# Patient Record
Sex: FEMALE | Race: WHITE
[De-identification: names, ages, dates, MRNs, and addresses within clinical notes are randomized per-mention and may not be internally consistent; named-entity substitution may affect disease eponyms.]

---

## 2017-05-04 ENCOUNTER — HOSPITAL ENCOUNTER (OUTPATIENT)
Age: 67
Discharge: HOME | End: 2017-05-04
Payer: MEDICARE

## 2017-05-04 DIAGNOSIS — C73: Primary | ICD-10-CM

## 2017-06-27 ENCOUNTER — HOSPITAL ENCOUNTER (OUTPATIENT)
Dept: HOSPITAL 76 - DI | Age: 67
Discharge: HOME | End: 2017-06-27
Attending: INTERNAL MEDICINE
Payer: MEDICARE

## 2017-06-27 DIAGNOSIS — R94.31: Primary | ICD-10-CM

## 2017-06-27 PROCEDURE — 93306 TTE W/DOPPLER COMPLETE: CPT

## 2018-05-22 ENCOUNTER — HOSPITAL ENCOUNTER (OUTPATIENT)
Dept: HOSPITAL 76 - DI | Age: 68
Discharge: HOME | End: 2018-05-22
Attending: INTERNAL MEDICINE
Payer: MEDICARE

## 2018-05-22 DIAGNOSIS — M85.89: ICD-10-CM

## 2018-05-22 DIAGNOSIS — Z78.0: ICD-10-CM

## 2018-05-22 DIAGNOSIS — M81.0: Primary | ICD-10-CM

## 2018-05-22 PROCEDURE — 77080 DXA BONE DENSITY AXIAL: CPT

## 2018-05-22 NOTE — DEXA REPORT
DEXA SCAN:  05/22/2018

 

CLINICAL INDICATION:  Postmenopausal.

 

TECHNIQUE:  Dual energy x-ray absorptiometry (DXA) was performed on a  [employer]
  system.  

Regions measured are the AP spine, femoral neck, and, if needed, forearm.

 

COMPARISON:  None.  In accordance with the International Society for Clinical 
Densitometry 

(ISCD) guidelines, data from previous exams may be reanalyzed using current 
recommendations and

techniques.  This is done to allow a more accurate basis for comparison with 
the current study.

 

FINDINGS

The data for the lumbar spine is as follows:







REGION BMD (g/cm/cm) T-SCORE Z-SCORE

 

L1 0.999 -1.1 0.6

 

L2 0.920 -2.3 -0.6

 

L3 1.022 -1.5 0.2

 

L4 1.044 -1.3 0.4

 

L1-L4 0.999 -1.5 0.2





NOTE:  All evaluable vertebrae are used for classification.

 

The data for the hip is as follows:





REGION BMD (g/cm/cm) T-SCORE Z-SCORE

 

Neck 0.713 -2.3 -0.8

 

TOTAL 0.741 -2.1 -0.8





NOTE:  The femoral neck or total proximal femur, whichever is lowest, is used 
for classification.

 

 

IMPRESSION

WHO CLASSIFICATION BASED ON THE INTERNATIONAL REFERENCE STANDARD IS OSTEOPENIA.

FRACTURE RISK IS INCREASED.

 

RECOMMENDATION:  Patients with diagnosis of osteoporosis or osteopenia should 
have regular bone

mineral density assessment. For those eligible for Medicare, routine testing is 
allowed once every 2 years.

Testing frequency can be increased for patients who have rapidly progressing 
disease or for those who 

are receiving medical therapy to restore bone mass.

 

COMMENT

World Health Organization (WHO) definitions for osteoporosis and osteopenia:

NORMAL BMD:  T-score at 1.0 or higher, fracture risk is low.

OSTEOPENIA BMD:  T-score between 1.0 and -2.5, fracture risk is increased.

OSTEOPOROSIS BMD:  T-score at 2.5 or lower, fracture risk high.

 

National Osteoporosis Foundation recommends:

1. Obtain adequate dietary calcium (at least 1200 mg per day) and vitamin D (400
-800 international units per day).

2. Participate, as appropriate, in regular weightbearing and muscle-
strengthening exercise.

3. Avoid tobacco use and reduce alcohol and caffeine intake.

4. For more detailed information see the website at www.NOF.org.

 

 

DD: 05/22/2018 10:41

TD: 05/22/2018 10:45

Job #: [id number]

SHU

## 2018-06-07 ENCOUNTER — HOSPITAL ENCOUNTER (OUTPATIENT)
Dept: HOSPITAL 76 - LAB.R | Age: 68
Discharge: HOME | End: 2018-06-07
Attending: INTERNAL MEDICINE
Payer: MEDICARE

## 2018-06-07 DIAGNOSIS — N30.00: Primary | ICD-10-CM

## 2018-06-07 PROCEDURE — 87086 URINE CULTURE/COLONY COUNT: CPT

## 2018-06-13 ENCOUNTER — HOSPITAL ENCOUNTER (OUTPATIENT)
Dept: HOSPITAL 76 - LAB | Age: 68
Discharge: HOME | End: 2018-06-13
Attending: INTERNAL MEDICINE
Payer: MEDICARE

## 2018-06-13 ENCOUNTER — HOSPITAL ENCOUNTER (EMERGENCY)
Dept: HOSPITAL 76 - ED | Age: 68
Discharge: HOME | End: 2018-06-13
Payer: MEDICARE

## 2018-06-13 VITALS — SYSTOLIC BLOOD PRESSURE: 118 MMHG | DIASTOLIC BLOOD PRESSURE: 60 MMHG

## 2018-06-13 DIAGNOSIS — Z79.899: ICD-10-CM

## 2018-06-13 DIAGNOSIS — E03.9: ICD-10-CM

## 2018-06-13 DIAGNOSIS — N20.2: Primary | ICD-10-CM

## 2018-06-13 DIAGNOSIS — M81.0: ICD-10-CM

## 2018-06-13 DIAGNOSIS — M81.8: ICD-10-CM

## 2018-06-13 DIAGNOSIS — C73: ICD-10-CM

## 2018-06-13 DIAGNOSIS — M81.0: Primary | ICD-10-CM

## 2018-06-13 DIAGNOSIS — E21.3: ICD-10-CM

## 2018-06-13 LAB
ALBUMIN DIAFP-MCNC: 3.9 G/DL (ref 3.2–5.5)
ALBUMIN/GLOB SERPL: 1.1 {RATIO} (ref 1–2.2)
ALP SERPL-CCNC: 68 IU/L (ref 42–121)
ALT SERPL W P-5'-P-CCNC: 21 IU/L (ref 10–60)
ANION GAP SERPL CALCULATED.4IONS-SCNC: 8 MMOL/L (ref 6–13)
AST SERPL W P-5'-P-CCNC: 25 IU/L (ref 10–42)
BASOPHILS NFR BLD AUTO: 0 10^3/UL (ref 0–0.1)
BASOPHILS NFR BLD AUTO: 0.7 %
BILIRUB BLD-MCNC: 0.7 MG/DL (ref 0.2–1)
BUN SERPL-MCNC: 17 MG/DL (ref 6–20)
CALCIUM UR-MCNC: 10.8 MG/DL (ref 8.5–10.3)
CHLORIDE SERPL-SCNC: 99 MMOL/L (ref 101–111)
CLARITY UR REFRACT.AUTO: (no result)
CO2 SERPL-SCNC: 29 MMOL/L (ref 21–32)
CREAT SERPLBLD-SCNC: 1.3 MG/DL (ref 0.4–1)
EOSINOPHIL # BLD AUTO: 0.1 10^3/UL (ref 0–0.7)
EOSINOPHIL NFR BLD AUTO: 1.3 %
ERYTHROCYTE [DISTWIDTH] IN BLOOD BY AUTOMATED COUNT: 12.4 % (ref 12–15)
GFRSERPLBLD MDRD-ARVRAT: 41 ML/MIN/{1.73_M2} (ref 89–?)
GLOBULIN SER-MCNC: 3.7 G/DL (ref 2.1–4.2)
GLUCOSE SERPL-MCNC: 86 MG/DL (ref 70–100)
GLUCOSE UR QL STRIP.AUTO: NEGATIVE MG/DL
HGB UR QL STRIP: 13.1 G/DL (ref 12–16)
KETONES UR QL STRIP.AUTO: NEGATIVE MG/DL
LYMPHOCYTES # SPEC AUTO: 2.4 10^3/UL (ref 1.5–3.5)
LYMPHOCYTES NFR BLD AUTO: 46.1 %
MCH RBC QN AUTO: 31.4 PG (ref 27–31)
MCHC RBC AUTO-ENTMCNC: 33.6 G/DL (ref 32–36)
MCV RBC AUTO: 93.4 FL (ref 81–99)
MONOCYTES # BLD AUTO: 0.4 10^3/UL (ref 0–1)
MONOCYTES NFR BLD AUTO: 7.4 %
NEUTROPHILS # BLD AUTO: 2.3 10^3/UL (ref 1.5–6.6)
NEUTROPHILS # SNV AUTO: 5.3 X10^3/UL (ref 4.8–10.8)
NEUTROPHILS NFR BLD AUTO: 44.5 %
NITRITE UR QL STRIP.AUTO: NEGATIVE
PDW BLD AUTO: 8 FL (ref 7.9–10.8)
PH UR STRIP.AUTO: 6.5 PH (ref 5–7.5)
PLATELET # BLD: 355 10^3/UL (ref 130–450)
PROT SPEC-MCNC: 7.6 G/DL (ref 6.7–8.2)
PROT UR STRIP.AUTO-MCNC: NEGATIVE MG/DL
RBC # UR STRIP.AUTO: (no result) /UL
RBC # URNS HPF: (no result) /HPF (ref 0–5)
RBC MAR: 4.16 10^6/UL (ref 4.2–5.4)
SODIUM SERPLBLD-SCNC: 136 MMOL/L (ref 135–145)
SP GR UR STRIP.AUTO: 1.01 (ref 1–1.03)
SQUAMOUS URNS QL MICRO: (no result)
TSH SERPL-ACNC: 2.5 UIU/ML (ref 0.34–5.6)
UROBILINOGEN UR QL STRIP.AUTO: (no result) E.U./DL
UROBILINOGEN UR STRIP.AUTO-MCNC: NEGATIVE MG/DL

## 2018-06-13 PROCEDURE — 96375 TX/PRO/DX INJ NEW DRUG ADDON: CPT

## 2018-06-13 PROCEDURE — 99284 EMERGENCY DEPT VISIT MOD MDM: CPT

## 2018-06-13 PROCEDURE — 96374 THER/PROPH/DIAG INJ IV PUSH: CPT

## 2018-06-13 PROCEDURE — 99283 EMERGENCY DEPT VISIT LOW MDM: CPT

## 2018-06-13 PROCEDURE — 74176 CT ABD & PELVIS W/O CONTRAST: CPT

## 2018-06-13 PROCEDURE — 36415 COLL VENOUS BLD VENIPUNCTURE: CPT

## 2018-06-13 PROCEDURE — 83970 ASSAY OF PARATHORMONE: CPT

## 2018-06-13 PROCEDURE — 96361 HYDRATE IV INFUSION ADD-ON: CPT

## 2018-06-13 PROCEDURE — 84443 ASSAY THYROID STIM HORMONE: CPT

## 2018-06-13 PROCEDURE — 83690 ASSAY OF LIPASE: CPT

## 2018-06-13 PROCEDURE — 84432 ASSAY OF THYROGLOBULIN: CPT

## 2018-06-13 PROCEDURE — 86800 THYROGLOBULIN ANTIBODY: CPT

## 2018-06-13 PROCEDURE — 85025 COMPLETE CBC W/AUTO DIFF WBC: CPT

## 2018-06-13 PROCEDURE — 81001 URINALYSIS AUTO W/SCOPE: CPT

## 2018-06-13 PROCEDURE — 80053 COMPREHEN METABOLIC PANEL: CPT

## 2018-06-13 PROCEDURE — 87086 URINE CULTURE/COLONY COUNT: CPT

## 2018-06-13 PROCEDURE — 81003 URINALYSIS AUTO W/O SCOPE: CPT

## 2018-06-13 NOTE — CT REPORT
Procedure Date:  06/13/2018   

Accession Number:  [id number] / [id number]                    

Procedure:  CT  - Abdomen/Pelvis W/O CPT Code:  

 

FULL RESULT:

 

 

EXAM:

CT ABDOMEN AND PELVIS (CT KUB)

 

EXAM DATE: 6/13/2018 10:03 PM.

 

CLINICAL HISTORY: Left flank pain.

 

COMPARISONS: None.

 

TECHNIQUE: Routine axial helical CT imaging was performed through the 

abdomen and pelvis without IV contrast. Reconstructions: Coronal and 

sagittal.

 

In accordance with CT protocol optimization, one or more of the following 

dose reduction techniques were utilized for this exam: automated exposure 

control, adjustment of mA and/or KV based on patient size, or use of 

iterative reconstructive technique.

 

FINDINGS:

Lung Bases: Unremarkable.

 

Right Kidney/Ureter: More than 10 nonobstructing stones in the kidney 

measuring up to 5 x 3 mm. No ureteral stone or obstructive uropathy seen.

 

Left Kidney/Ureter: More than 10 nonobstructing stones in the kidney. The 

largest stones measure 4 x 3 mm and 6 x 2 mm. Moderately obstructing 

stone in the distal ureter at the level of the acetabulum measuring 4 x 3 

mm, series 3 image 123.

 

Other Solid Organs: Noncontrast images of the solid organs are grossly 

unremarkable.

 

Gallbladder/Bile Ducts: Unremarkable.

 

Peritoneal Cavity: No bowel obstruction seen. No diverticulitis. No free 

air or free fluid. No lymphadenopathy. Appendix appears normal.

 

Pelvic Organs: No bladder stones or wall thickening. Noncontrast images 

of the visualized pelvic organs are unremarkable.

 

Vasculature: Mild atherosclerosis. No aortic aneurysm.

 

Other: None.

IMPRESSION:

1. Moderately obstructing 4 x 3 mm stone in the distal left ureter at the 

level of the acetabulum.

2. More than 10 nonobstructing stones in each kidney.

 

RADIA

## 2018-06-13 NOTE — ED PHYSICIAN DOCUMENTATION
PD HPI ABD PAIN





- Stated complaint


Stated Complaint: LT ABD/BACK PX





- Chief complaint


Chief Complaint: Abd Pain





- History obtained from


History obtained from: Patient, Family





- History of Present Illness


Timing - onset: How many days ago (4)


Timing - details: Gradual onset, Still present


Quality: Cramping, Aching


Location: LLQ


Radiation: Left flank


Associated symptoms: Nausea, Diarrhea.  No: Vomiting


Similar symptoms before: Work up / diagnostics


Recently seen: Clinic





- Additional information


Additional information: 





Patient is a 68 year old female who is presenting to the emergency department 

for abdominal pain.  Patient states that her symptoms started on thursday, (5 

days prior).  Patient was seen by her pmd and diagnosed with a uti.  patient 

was started on bactrim.  patient took a three day course.  Patietn states that 

her symptoms returned today.  patient had her blood drawn and came to the 

emergency department for evaluation.  patient states that her pain is mainly in 

her left lower quadrant with radiation to her left flank.  





Review of Systems


Constitutional: denies: Fever, Chills


Eyes: reports: Reviewed and negative


Ears: reports: Reviewed and negative


Cardiac: denies: Chest pain / pressure, Palpitations


GI: reports: Abdominal Pain, Nausea, Diarrhea.  denies: Vomiting, Constipation


: reports: Dysuria.  denies: Frequency, Hesitancy


Skin: denies: Rash, Lesions


Neurologic: denies: Generalized weakness, Focal weakness, Numbness


Immunocompromised: denies: Immunocompromised





PD PAST MEDICAL HISTORY





- Past Medical History


Respiratory: None


Endocrine/Autoimmune: HyPOthyroidism, Other


Psych: None


Musculoskeletal: None





- Past Surgical History


Past Surgical History: No





- Present Medications


Home Medications: 


 Ambulatory Orders











 Medication  Instructions  Recorded  Confirmed


 


Calcitriol 0.5 mg PO TID 01/12/16 01/12/16


 


Calcium Citrate 200 mg PO DAILY 01/12/16 01/12/16


 


Levothyroxine [Synthroid] 112 mcg PO DAILY 01/12/16 01/12/16


 


Ketorolac [Toradol] 10 mg PO Q6H #14 tablet 06/13/18 


 


Ondansetron Odt [Zofran] 4 mg TL Q6H PRN #14 tablet 06/13/18 


 


Tamsulosin [Flomax] 0.4 mg PO DAILY #14 capsule 06/13/18 














- Allergies


Allergies/Adverse Reactions: 


 Allergies











Allergy/AdvReac Type Severity Reaction Status Date / Time


 


azithromycin Allergy  Unknown Verified 06/13/18 20:55





[From Zithromax Z-Nikunj]     














- Social History


Does the pt smoke?: No


Smoking Status: Never smoker


Does the pt drink ETOH?: No


Does the pt have substance abuse?: No





PD ED PE NORMAL





- Vitals


Vital signs reviewed: Yes





- General


General: Alert and oriented X 3





- HEENT


HEENT: Atraumatic





- Cardiac


Cardiac: RRR





- Respiratory


Respiratory: No respiratory distress





- Derm


Derm: Normal color, Warm and dry





- Extremities


Extremities: No deformity





- Neuro


Neuro: Alert and oriented X 3


Eye Opening: Spontaneous


Motor: Obeys Commands


Verbal: Oriented


GCS Score: 15





PD ED PE EXPANDED





- HEENT


HEENT: Dry mucous membranes





- Abdomen


Abdomen: Tender to palpation, LUQ, LLQ.  No: Rebound, Guarding





Results





- Vitals


Vitals: 


 Vital Signs - 24 hr











  06/13/18 06/13/18





  20:53 22:37


 


Temperature 36.6 C 36.2 C L


 


Heart Rate 86 91


 


Respiratory 16 20





Rate  


 


Blood Pressure 118/73 118/60


 


O2 Saturation 100 95








 Oxygen











O2 Source                      Room air

















- Labs


Labs: 


 Laboratory Tests











  06/13/18





  21:00


 


Urine Color  YELLOW


 


Urine Clarity  CLOUDY


 


Urine pH  6.5


 


Ur Specific Gravity  1.015


 


Urine Protein  NEGATIVE


 


Urine Glucose (UA)  NEGATIVE


 


Urine Ketones  NEGATIVE


 


Urine Occult Blood  LARGE H


 


Urine Nitrite  NEGATIVE


 


Urine Bilirubin  NEGATIVE


 


Urine Urobilinogen  0.2 (NORMAL)


 


Ur Leukocyte Esterase  SMALL H


 


Urine RBC  TNTC H


 


Urine WBC  6-10 H


 


Ur Squamous Epith Cells  RARE Squamous


 


Urine Bacteria  None Seen


 


Ur Microscopic Review  INDICATED


 


Urine Culture Comments  INDICATED














- Rads (name of study)


  ** ct abd pelvis


Radiology: Final report received (multiple renal stones bilaterally over ten 

each side with partially obstruction 4mm by 3mm stone at left uvj)





PD MEDICAL DECISION MAKING





- ED course


Complexity details: reviewed old records, reviewed results, re-evaluated patient

, considered differential, d/w patient, d/w family


ED course: 





Patient was seen and examined at bedside.  IV access was gained.  patient's 

labs had been ordered earlier in the day.  patient was started on a fluid bolus

, toradol and zofran.  Urine was collected and showed hematuria.  Imaging was 

ordered.  when patient returned from imaging she stated her pain had resolved.  

images were reviewed and there were multiple stones, at least ten in each 

kidney.  there was one obstructing stone but it was less than 5mm.  patient had 

no urinary tract infection and was able to tolerate PO.  patient required no 

further inpatient work up at this time and was stable for discharge with 

outpatient follow up.  





- Sepsis Event


Vital Signs: 


 Vital Signs - 24 hr











  06/13/18 06/13/18





  20:53 22:37


 


Temperature 36.6 C 36.2 C L


 


Heart Rate 86 91


 


Respiratory 16 20





Rate  


 


Blood Pressure 118/73 118/60


 


O2 Saturation 100 95








 Oxygen











O2 Source                      Room air

















Departure





- Departure


Disposition: 01 Home, Self Care


Clinical Impression: 


 Kidney stones





Condition: Good


Instructions:  ED Stone Renal W Colic


Follow-Up: 


[facility] [Provider Group]


Prescriptions: 


Ketorolac [Toradol] 10 mg PO Q6H #14 tablet


Ondansetron Odt [Zofran] 4 mg TL Q6H PRN #14 tablet


 PRN Reason: Nausea / Vomiting


Tamsulosin [Flomax] 0.4 mg PO DAILY #14 capsule


Comments: 


Your symptoms today are being caused by kidney stones.  You do have multiple 

stones and it could be in part from your calcium supplements.  You should 

follow  up with your doctor and the urology group.  You should follow up with 

your endocrinologist concerning your calcium supplements.  You should stay well 

hydrated. and can take ibuprofen (600mg and tylenol 650mg) as needed for pain.  

You should return to the emergency department for fevers, chills, vomiting, new 

worsening or uncontrollable symptoms.  


Discharge Date/Time: 06/13/18 23:15

## 2018-06-15 LAB
THYROGLOB AB SERPL-ACNC: <1 IU/ML
THYROGLOB SERPL-MCNC: 0.3 NG/ML

## 2018-08-16 ENCOUNTER — HOSPITAL ENCOUNTER (OUTPATIENT)
Dept: HOSPITAL 76 - LAB | Age: 68
Discharge: HOME | End: 2018-08-16
Attending: INTERNAL MEDICINE
Payer: MEDICARE

## 2018-08-16 DIAGNOSIS — E20.8: ICD-10-CM

## 2018-08-16 DIAGNOSIS — C73: Primary | ICD-10-CM

## 2018-08-16 DIAGNOSIS — E89.0: ICD-10-CM

## 2018-08-16 LAB
ANION GAP SERPL CALCULATED.4IONS-SCNC: 6 MMOL/L (ref 6–13)
BUN SERPL-MCNC: 18 MG/DL (ref 6–20)
CALCIUM UR-MCNC: 8.5 MG/DL (ref 8.5–10.3)
CHLORIDE SERPL-SCNC: 101 MMOL/L (ref 101–111)
CO2 SERPL-SCNC: 28 MMOL/L (ref 21–32)
CREAT SERPLBLD-SCNC: 0.9 MG/DL (ref 0.4–1)
GFRSERPLBLD MDRD-ARVRAT: 63 ML/MIN/{1.73_M2} (ref 89–?)
GLUCOSE SERPL-MCNC: 96 MG/DL (ref 70–100)
SODIUM SERPLBLD-SCNC: 135 MMOL/L (ref 135–145)
T4 FREE SERPL-MCNC: 1.1 NG/DL (ref 0.58–1.64)
TSH SERPL-ACNC: 0.97 UIU/ML (ref 0.34–5.6)

## 2018-08-16 PROCEDURE — 84439 ASSAY OF FREE THYROXINE: CPT

## 2018-08-16 PROCEDURE — 36415 COLL VENOUS BLD VENIPUNCTURE: CPT

## 2018-08-16 PROCEDURE — 86800 THYROGLOBULIN ANTIBODY: CPT

## 2018-08-16 PROCEDURE — 82306 VITAMIN D 25 HYDROXY: CPT

## 2018-08-16 PROCEDURE — 80048 BASIC METABOLIC PNL TOTAL CA: CPT

## 2018-08-16 PROCEDURE — 83970 ASSAY OF PARATHORMONE: CPT

## 2018-08-16 PROCEDURE — 84443 ASSAY THYROID STIM HORMONE: CPT

## 2018-08-16 PROCEDURE — 84432 ASSAY OF THYROGLOBULIN: CPT

## 2018-08-20 LAB
THYROGLOB AB SERPL-ACNC: <1 IU/ML
THYROGLOB SERPL-MCNC: 0.5 NG/ML

## 2018-10-25 ENCOUNTER — HOSPITAL ENCOUNTER (OUTPATIENT)
Dept: HOSPITAL 76 - LAB | Age: 68
Discharge: HOME | End: 2018-10-25
Attending: PHYSICIAN ASSISTANT
Payer: MEDICARE

## 2018-10-25 DIAGNOSIS — N20.0: Primary | ICD-10-CM

## 2018-10-25 LAB
ALBUMIN DIAFP-MCNC: 4 G/DL (ref 3.2–5.5)
ANION GAP SERPL CALCULATED.4IONS-SCNC: 9 MMOL/L (ref 6–13)
BUN SERPL-MCNC: 14 MG/DL (ref 6–20)
CALCIUM UR-MCNC: 8.5 MG/DL (ref 8.5–10.3)
CHLORIDE SERPL-SCNC: 101 MMOL/L (ref 101–111)
CO2 SERPL-SCNC: 30 MMOL/L (ref 21–32)
CREAT SERPLBLD-SCNC: 1 MG/DL (ref 0.4–1)
GFRSERPLBLD MDRD-ARVRAT: 55 ML/MIN/{1.73_M2} (ref 89–?)
GLUCOSE SERPL-MCNC: 102 MG/DL (ref 70–100)
PHOSPHATE BLD-MCNC: 4.1 MG/DL (ref 2.5–4.6)
SODIUM SERPLBLD-SCNC: 140 MMOL/L (ref 135–145)
URATE SERPL-MCNC: 4.8 MG/DL (ref 2.6–7.2)

## 2018-10-25 PROCEDURE — 84550 ASSAY OF BLOOD/URIC ACID: CPT

## 2018-10-25 PROCEDURE — 80069 RENAL FUNCTION PANEL: CPT

## 2018-10-25 PROCEDURE — 36415 COLL VENOUS BLD VENIPUNCTURE: CPT

## 2019-01-17 ENCOUNTER — HOSPITAL ENCOUNTER (OUTPATIENT)
Dept: HOSPITAL 76 - LAB | Age: 69
Discharge: HOME | End: 2019-01-17
Attending: INTERNAL MEDICINE
Payer: MEDICARE

## 2019-01-17 DIAGNOSIS — E20.8: ICD-10-CM

## 2019-01-17 DIAGNOSIS — C73: Primary | ICD-10-CM

## 2019-01-17 DIAGNOSIS — E89.0: ICD-10-CM

## 2019-01-17 LAB
ANION GAP SERPL CALCULATED.4IONS-SCNC: 11 MMOL/L (ref 6–13)
BUN SERPL-MCNC: 16 MG/DL (ref 6–20)
CALCIUM UR-MCNC: 8.6 MG/DL (ref 8.5–10.3)
CHLORIDE SERPL-SCNC: 99 MMOL/L (ref 101–111)
CO2 SERPL-SCNC: 27 MMOL/L (ref 21–32)
CREAT SERPLBLD-SCNC: 1.1 MG/DL (ref 0.4–1)
GFRSERPLBLD MDRD-ARVRAT: 50 ML/MIN/{1.73_M2} (ref 89–?)
GLUCOSE SERPL-MCNC: 93 MG/DL (ref 70–100)
SODIUM SERPLBLD-SCNC: 137 MMOL/L (ref 135–145)
T4 FREE SERPL-MCNC: 1.13 NG/DL (ref 0.58–1.64)
TSH SERPL-ACNC: 3.09 UIU/ML (ref 0.34–5.6)

## 2019-01-17 PROCEDURE — 83970 ASSAY OF PARATHORMONE: CPT

## 2019-01-17 PROCEDURE — 84432 ASSAY OF THYROGLOBULIN: CPT

## 2019-01-17 PROCEDURE — 84439 ASSAY OF FREE THYROXINE: CPT

## 2019-01-17 PROCEDURE — 82306 VITAMIN D 25 HYDROXY: CPT

## 2019-01-17 PROCEDURE — 84443 ASSAY THYROID STIM HORMONE: CPT

## 2019-01-17 PROCEDURE — 80048 BASIC METABOLIC PNL TOTAL CA: CPT

## 2019-01-17 PROCEDURE — 86800 THYROGLOBULIN ANTIBODY: CPT

## 2019-01-17 PROCEDURE — 36415 COLL VENOUS BLD VENIPUNCTURE: CPT

## 2019-06-10 ENCOUNTER — HOSPITAL ENCOUNTER (OUTPATIENT)
Dept: HOSPITAL 76 - LAB.F | Age: 69
Discharge: HOME | End: 2019-06-10
Attending: INTERNAL MEDICINE
Payer: MEDICARE

## 2019-06-10 DIAGNOSIS — C73: Primary | ICD-10-CM

## 2019-06-10 DIAGNOSIS — E89.0: ICD-10-CM

## 2019-06-10 DIAGNOSIS — E20.8: ICD-10-CM

## 2019-06-10 LAB
ALBUMIN DIAFP-MCNC: 4.1 G/DL (ref 3.2–5.5)
ANION GAP SERPL CALCULATED.4IONS-SCNC: 10 MMOL/L (ref 6–13)
BUN SERPL-MCNC: 15 MG/DL (ref 6–20)
CALCIUM UR-MCNC: 8.6 MG/DL (ref 8.5–10.3)
CHLORIDE SERPL-SCNC: 101 MMOL/L (ref 101–111)
CO2 SERPL-SCNC: 29 MMOL/L (ref 21–32)
CREAT SERPLBLD-SCNC: 0.9 MG/DL (ref 0.4–1)
GFRSERPLBLD MDRD-ARVRAT: 62 ML/MIN/{1.73_M2} (ref 89–?)
GLUCOSE SERPL-MCNC: 94 MG/DL (ref 70–100)
MAGNESIUM SERPL-MCNC: 2.4 MG/DL (ref 1.7–2.8)
PHOSPHATE BLD-MCNC: 4.6 MG/DL (ref 2.5–4.6)
SODIUM SERPLBLD-SCNC: 140 MMOL/L (ref 135–145)
T4 FREE SERPL-MCNC: 1.01 NG/DL (ref 0.58–1.64)
TSH SERPL-ACNC: 2.6 UIU/ML (ref 0.34–5.6)

## 2019-06-10 PROCEDURE — 86800 THYROGLOBULIN ANTIBODY: CPT

## 2019-06-10 PROCEDURE — 84443 ASSAY THYROID STIM HORMONE: CPT

## 2019-06-10 PROCEDURE — 82306 VITAMIN D 25 HYDROXY: CPT

## 2019-06-10 PROCEDURE — 84432 ASSAY OF THYROGLOBULIN: CPT

## 2019-06-10 PROCEDURE — 80048 BASIC METABOLIC PNL TOTAL CA: CPT

## 2019-06-10 PROCEDURE — 83735 ASSAY OF MAGNESIUM: CPT

## 2019-06-10 PROCEDURE — 36415 COLL VENOUS BLD VENIPUNCTURE: CPT

## 2019-06-10 PROCEDURE — 84439 ASSAY OF FREE THYROXINE: CPT

## 2019-06-10 PROCEDURE — 80069 RENAL FUNCTION PANEL: CPT

## 2019-06-12 LAB — THYROGLOB SERPL-MCNC: 0.4 NG/ML

## 2019-12-18 ENCOUNTER — HOSPITAL ENCOUNTER (OUTPATIENT)
Dept: HOSPITAL 76 - DI.S | Age: 69
Discharge: HOME | End: 2019-12-18
Attending: NURSE PRACTITIONER
Payer: MEDICARE

## 2019-12-18 DIAGNOSIS — Z12.31: Primary | ICD-10-CM

## 2019-12-18 PROCEDURE — 77067 SCR MAMMO BI INCL CAD: CPT

## 2019-12-18 NOTE — MAMMOGRAPHY REPORT
Reason:  ROUTINE MAMMO

Procedure Date:  12/18/2019   

Accession Number:  [id number] / [id number]                    

Procedure:  MGS - Screening Mammo Dig Bilat CPT Code:  

 

***Final Report***

 

 

FULL RESULT:

 

 

EXAM: Screening Mammo Dig Bilat

 

DATE: 12/18/2019 8:24 AM

 

CLINICAL HISTORY:  Routine screening. History of benign left surgery

 

TECHNIQUE: (B) - Bilateral  CC and MLO views were obtained.

 

COMPARISON: 6/27/2016, 5/7/2014, 4/25/2012, 4/27/2011 and 3/31/2010

 

PARENCHYMAL PATTERN: (VD) - The breasts demonstrate extremely dense 

parenchyma bilaterally, limiting the sensitivity of mammography.

 

FINDINGS:

Right: No significant interval change. There are no suspicious masses, 

calcifications, or areas of distortion.

 

Left: Stable postsurgical change. 3 punctate new calcifications in the 

3:00 position 5 to 6 cm from the nipple for which magnification views are 

suggested. No new dominant mass or architectural distortion.

 

IMPRESSION: Incomplete examination.  BI-RADS category 0.  Needs 

additional evaluation left breast by magnification views. Negative right 

breast.

 

RECOMMENDATION: (ADDMAM) - Recommend additional mammographic views.   

Left breast

 

BI-RADS CATEGORY: (0) - Incomplete Examination - need additional 

evaluation.

 

STANDARD QUALIFYING STATEMENTS:

1.  This examination was not reviewed with the aid of Computer-Aided 

Detection (CAD).

2.  A negative or benign  imaging report should not preclude biopsy if 

clinically suspicious findings are present.

3.  Dense breasts may obscure an underlying neoplasm.

4. This examination was reviewed without the aid of 3D breast imaging 

(tomosynthesis).

## 2020-01-13 ENCOUNTER — HOSPITAL ENCOUNTER (OUTPATIENT)
Dept: HOSPITAL 76 - DI | Age: 70
Discharge: HOME | End: 2020-01-13
Attending: NURSE PRACTITIONER
Payer: MEDICARE

## 2020-01-13 DIAGNOSIS — R92.1: Primary | ICD-10-CM

## 2020-01-13 NOTE — MAMMOGRAPHY REPORT
Reason:  ABN MAMMO - FT SPEC VIEWS

Procedure Date:  01/13/2020   

Accession Number:  [id number] / [id number]                    

Procedure:  DEBORA - Diag Special Views Dig LT CPT Code:  

 

***Final Report***

 

 

FULL RESULT:

 

 

EXAM: Diag Special Views Dig LT

 

DATE: 1/13/2020 2:35 PM

 

CLINICAL HISTORY:  Diagnostic examination. History of benign left breast 

biopsy. The patient is recalled from screening examination for finding of 

3 punctate new calcifications in the 3:00 position of the left breast.

 

TECHNIQUE: (L) - Left  CC, spot magnified CC, LM, spot magnified LM views 

are obtained.

 

COMPARISON: 12/18/2019 through 3/31/2010.

 

PARENCHYMAL PATTERN: (D) - The breast(s) demonstrate(s) heterogeneously 

dense fibroglandular parenchyma.

 

FINDINGS:

The previously seen 3 punctate calcifications in the lateral left breast 

approximately 5.5 cm from the nipple on the cc projection show no 

associated architectural distortion or mass and are not definitively 

located on the LM projection, potentially 4.2 cm from the nipple in the 

lower breast, probably benign. There are no suspicious masses, 

calcifications, or areas of distortion.

 

IMPRESSION: Probably Benign. BI-RADS category 3.

 

RECOMMENDATION: (6MOS) - Recommend 6 month follow-up exam. Left breast.

 

BI-RADS CATEGORY: (3) - Probably Benign.

 

STANDARD QUALIFYING STATEMENTS:

1.  This examination was not reviewed with the aid of Computer-Aided 

Detection (CAD).

2.  A negative or benign  imaging report should not preclude biopsy if 

clinically suspicious findings are present.

3.  Dense breasts may obscure an underlying neoplasm.

4. This examination was reviewed with the aid of 3D breast imaging 

(tomosynthesis).

## 2020-05-21 ENCOUNTER — HOSPITAL ENCOUNTER (OUTPATIENT)
Dept: HOSPITAL 76 - LAB | Age: 70
Discharge: HOME | End: 2020-05-21
Attending: INTERNAL MEDICINE
Payer: MEDICARE

## 2020-05-21 DIAGNOSIS — E20.8: ICD-10-CM

## 2020-05-21 DIAGNOSIS — E89.0: Primary | ICD-10-CM

## 2020-05-21 LAB
T3 SERPL-MCNC: 0.76 NG/ML (ref 0.87–1.78)
T4 FREE SERPL-MCNC: 1.11 NG/DL (ref 0.58–1.64)

## 2020-05-21 PROCEDURE — 81599 UNLISTED MAAA: CPT

## 2020-05-21 PROCEDURE — 86800 THYROGLOBULIN ANTIBODY: CPT

## 2020-05-21 PROCEDURE — 84439 ASSAY OF FREE THYROXINE: CPT

## 2020-05-21 PROCEDURE — 84432 ASSAY OF THYROGLOBULIN: CPT

## 2020-05-21 PROCEDURE — 36415 COLL VENOUS BLD VENIPUNCTURE: CPT

## 2020-05-21 PROCEDURE — 84445 ASSAY OF TSI GLOBULIN: CPT

## 2020-05-21 PROCEDURE — 84480 ASSAY TRIIODOTHYRONINE (T3): CPT

## 2020-09-03 ENCOUNTER — HOSPITAL ENCOUNTER (OUTPATIENT)
Dept: HOSPITAL 76 - DI | Age: 70
Discharge: HOME | End: 2020-09-03
Attending: NURSE PRACTITIONER
Payer: MEDICARE

## 2020-09-03 DIAGNOSIS — R92.2: Primary | ICD-10-CM

## 2020-09-08 NOTE — MAMMOGRAPHY REPORT
UNILATERAL LEFT DIGITAL DIAGNOSTIC MAMMOGRAM 3D/2D: 9/3/2020

 

CLINICAL: Patient returns for 6 month follow up on left breast for calcifications.  

 

Comparison is made to exams dated:  1/13/2020 mammogram, 6/27/2016 mammogram, and 12/18/2019 mammogra
m - [facility].  The tissue of left breast is predominantly fatty.  

 

There is a 2 mm area of grouped punctate calcifications in the left breast at 4 o'clock middle depth.
  These are not significantly changed.  

No other significant masses or calcifications are seen in the breast.  

 

IMPRESSION: PROBABLY BENIGN

The 2 mm area of grouped punctate calcifications in the left breast are probably benign.  

A follow-up mammogram in 6 months is recommended to demonstrate stability.  

 

 

This exam was interpreted at Station ID: [id number].  

 

NOTE: For mammograms, a report in lay terms will be sent to the patient. Approximately 15% of breast 
malignancies will not be visualized mammographically. In the management of a palpable breast mass, a 
negative mammogram must not discourage biopsy of a clinically suspicious lesion.

 

SUMMARY:

Cumulative months of stability: 6. Recommend follow-up left breast diagnostic mammogram in 6 months t
o demonstrate continued stability. The patient will be due for right breast screening mammogram at th
e time of the follow-up exam.  

 

 

Electronically Signed By: Kaushik wasserman/[email or web address]:9/3/2020 16:40:42  

 

 

 

ACR BI-RADS Category 3: Probably benign 3343F

PARENCHYMAL PATTERN: (F) - The breast(s) demonstrate(s) diffuse fatty replacement.

BI-RADS CATEGORY: (3) - 3

Mammogram

[id number]

6 month follow-up

LATERALITY: (B)

## 2020-12-28 ENCOUNTER — HOSPITAL ENCOUNTER (OUTPATIENT)
Dept: HOSPITAL 76 - COV | Age: 70
Discharge: HOME | End: 2020-12-28
Attending: FAMILY MEDICINE
Payer: MEDICARE

## 2020-12-28 DIAGNOSIS — R05: Primary | ICD-10-CM

## 2020-12-28 DIAGNOSIS — Z20.828: ICD-10-CM

## 2020-12-28 DIAGNOSIS — R09.81: ICD-10-CM

## 2020-12-28 DIAGNOSIS — R06.02: ICD-10-CM

## 2021-04-22 ENCOUNTER — HOSPITAL ENCOUNTER (OUTPATIENT)
Dept: HOSPITAL 76 - LAB.S | Age: 71
Discharge: HOME | End: 2021-04-22
Attending: INTERNAL MEDICINE
Payer: MEDICARE

## 2021-04-22 DIAGNOSIS — E21.0: ICD-10-CM

## 2021-04-22 DIAGNOSIS — C73: Primary | ICD-10-CM

## 2021-04-22 LAB
ALBUMIN DIAFP-MCNC: 4.4 G/DL (ref 3.2–5.5)
ANION GAP SERPL CALCULATED.4IONS-SCNC: 12 MMOL/L (ref 6–13)
BUN SERPL-MCNC: 18 MG/DL (ref 6–20)
CALCIUM UR-MCNC: 9 MG/DL (ref 8.5–10.3)
CHLORIDE SERPL-SCNC: 100 MMOL/L (ref 101–111)
CO2 SERPL-SCNC: 27 MMOL/L (ref 21–32)
CREAT SERPLBLD-SCNC: 1 MG/DL (ref 0.4–1)
GFRSERPLBLD MDRD-ARVRAT: 55 ML/MIN/{1.73_M2} (ref 89–?)
GLUCOSE SERPL-MCNC: 93 MG/DL (ref 70–100)
PHOSPHATE BLD-MCNC: 4.9 MG/DL (ref 2.5–4.6)
POTASSIUM SERPL-SCNC: 3.8 MMOL/L (ref 3.5–5)
SODIUM SERPLBLD-SCNC: 139 MMOL/L (ref 135–145)

## 2021-04-22 PROCEDURE — 84432 ASSAY OF THYROGLOBULIN: CPT

## 2021-04-22 PROCEDURE — 82306 VITAMIN D 25 HYDROXY: CPT

## 2021-04-22 PROCEDURE — 36415 COLL VENOUS BLD VENIPUNCTURE: CPT

## 2021-04-22 PROCEDURE — 86800 THYROGLOBULIN ANTIBODY: CPT

## 2021-04-22 PROCEDURE — 80069 RENAL FUNCTION PANEL: CPT

## 2021-04-22 PROCEDURE — 84443 ASSAY THYROID STIM HORMONE: CPT

## 2021-05-21 ENCOUNTER — HOSPITAL ENCOUNTER (OUTPATIENT)
Dept: HOSPITAL 76 - DI | Age: 71
Discharge: HOME | End: 2021-05-21
Attending: INTERNAL MEDICINE
Payer: MEDICARE

## 2021-05-21 DIAGNOSIS — E21.0: Primary | ICD-10-CM

## 2021-05-21 DIAGNOSIS — M85.89: ICD-10-CM

## 2021-05-21 NOTE — DEXA REPORT
PROCEDURE: Dexa Spine and/or Hip

 

INDICATIONS: PRIMARY HYPERPARATHYROIDISM

 

TECHNIQUE: Dual energy x-ray absorptiometry (DXA) was performed on a [employer] System.  Regions measur
ed are the AP Spine, femoral neck, and if needed forearm.

 

COMPARISON: 5/22/2018.

  

FINDINGS:

 

Lumbar Spine: 

Bone Mineral Density   0.996 g/cm/cm,T score  -1.5. There is interval 0.3% decrease in total lumbar s
pine bone mineral density.

 

Left Hip:

Bone Mineral Density  0.736 g/cm/cm,T score -2.2. There is interval 0.7% decrease in left total hip b
one mineral density.

 

Left Femoral Neck:

Bone Mineral Density  0.741 g/cm/cm, T score -2.1.

 

(T score greater or equal to -1.0: NORMAL)

(T score from -1.1 to -2.4: OSTEOPENIA)

(T score less than or equal to -2.5 to: OSTEOPOROSIS)

 

Impression: Osteopenia.

 

Patients with diagnosis of osteoporosis or osteopenia should have regular bone mineral density assess
ment.  For those eligible for Medicare, routine testing is allowed once every 2 years.  Testing frequ
ency can be increased for patients who have rapidly progressing disease or for those who are receivin
g medical therapy to restore bone mass.

 

Reviewed by: Abraham Valencia MD on 5/21/2021 11:05 AM PDT

Approved by: Abraham Valencia MD on 5/21/2021 11:05 AM PDT

 

 

Station ID:  [id number]

## 2022-05-06 ENCOUNTER — HOSPITAL ENCOUNTER (OUTPATIENT)
Dept: HOSPITAL 76 - LAB | Age: 72
Discharge: HOME | End: 2022-05-06
Attending: INTERNAL MEDICINE
Payer: MEDICARE

## 2022-05-06 DIAGNOSIS — C73: Primary | ICD-10-CM

## 2022-05-06 DIAGNOSIS — E89.0: ICD-10-CM

## 2022-05-06 DIAGNOSIS — M81.0: ICD-10-CM

## 2022-05-06 DIAGNOSIS — N20.0: ICD-10-CM

## 2022-05-06 LAB
ALBUMIN DIAFP-MCNC: 4.3 G/DL (ref 3.2–5.5)
ANION GAP SERPL CALCULATED.4IONS-SCNC: 11 MMOL/L (ref 6–13)
BUN SERPL-MCNC: 16 MG/DL (ref 6–20)
CALCIUM UR-MCNC: 9.1 MG/DL (ref 8.5–10.3)
CHLORIDE SERPL-SCNC: 101 MMOL/L (ref 101–111)
CO2 SERPL-SCNC: 27 MMOL/L (ref 21–32)
CREAT SERPLBLD-SCNC: 1 MG/DL (ref 0.4–1)
GFRSERPLBLD MDRD-ARVRAT: 55 ML/MIN/{1.73_M2} (ref 89–?)
GLUCOSE SERPL-MCNC: 98 MG/DL (ref 70–100)
PHOSPHATE BLD-MCNC: 4.3 MG/DL (ref 2.5–4.6)
POTASSIUM SERPL-SCNC: 4.1 MMOL/L (ref 3.5–5)
SODIUM SERPLBLD-SCNC: 139 MMOL/L (ref 135–145)
TSH SERPL-ACNC: 3.99 UIU/ML (ref 0.34–5.6)

## 2022-05-06 PROCEDURE — 86800 THYROGLOBULIN ANTIBODY: CPT

## 2022-05-06 PROCEDURE — 82306 VITAMIN D 25 HYDROXY: CPT

## 2022-05-06 PROCEDURE — 36415 COLL VENOUS BLD VENIPUNCTURE: CPT

## 2022-05-06 PROCEDURE — 83970 ASSAY OF PARATHORMONE: CPT

## 2022-05-06 PROCEDURE — 84443 ASSAY THYROID STIM HORMONE: CPT

## 2022-05-06 PROCEDURE — 80069 RENAL FUNCTION PANEL: CPT

## 2023-05-02 ENCOUNTER — HOSPITAL ENCOUNTER (OUTPATIENT)
Dept: HOSPITAL 76 - LAB.S | Age: 73
Discharge: HOME | End: 2023-05-02
Attending: INTERNAL MEDICINE
Payer: MEDICARE

## 2023-05-02 DIAGNOSIS — E89.0: Primary | ICD-10-CM

## 2023-05-02 DIAGNOSIS — E20.8: ICD-10-CM

## 2023-05-02 LAB
ALBUMIN DIAFP-MCNC: 3.8 G/DL (ref 3.2–5.5)
ANION GAP SERPL CALCULATED.4IONS-SCNC: 7 MMOL/L (ref 6–13)
BUN SERPL-MCNC: 18 MG/DL (ref 6–20)
CALCIUM UR-MCNC: 9.2 MG/DL (ref 8.5–10.3)
CHLORIDE SERPL-SCNC: 106 MMOL/L (ref 101–111)
CO2 SERPL-SCNC: 28 MMOL/L (ref 21–32)
CREAT SERPLBLD-SCNC: 1 MG/DL (ref 0.4–1)
GFRSERPLBLD MDRD-ARVRAT: 55 ML/MIN/{1.73_M2} (ref 89–?)
GLUCOSE SERPL-MCNC: 97 MG/DL (ref 70–100)
PHOSPHATE BLD-MCNC: 3.6 MG/DL (ref 2.5–4.6)
POTASSIUM SERPL-SCNC: 4.2 MMOL/L (ref 3.5–5)
SODIUM SERPLBLD-SCNC: 141 MMOL/L (ref 135–145)
T4 FREE SERPL-MCNC: 1.37 NG/DL (ref 0.58–1.64)
TSH SERPL-ACNC: 0.58 UIU/ML (ref 0.34–5.6)

## 2023-05-02 PROCEDURE — 86800 THYROGLOBULIN ANTIBODY: CPT

## 2023-05-02 PROCEDURE — 80069 RENAL FUNCTION PANEL: CPT

## 2023-05-02 PROCEDURE — 84439 ASSAY OF FREE THYROXINE: CPT

## 2023-05-02 PROCEDURE — 84443 ASSAY THYROID STIM HORMONE: CPT

## 2023-05-02 PROCEDURE — 82306 VITAMIN D 25 HYDROXY: CPT

## 2023-05-02 PROCEDURE — 36415 COLL VENOUS BLD VENIPUNCTURE: CPT

## 2023-05-02 PROCEDURE — 83970 ASSAY OF PARATHORMONE: CPT

## 2023-05-03 LAB
25(OH)D3+25(OH)D2 SERPL-MCNC: 55.7 NG/ML (ref 30–100)
THYROGLOB AB SERPL-ACNC: <1 IU/ML (ref 0–0.9)

## 2023-05-18 ENCOUNTER — HOSPITAL ENCOUNTER (OUTPATIENT)
Dept: HOSPITAL 76 - DI | Age: 73
Discharge: HOME | End: 2023-05-18
Attending: INTERNAL MEDICINE
Payer: MEDICARE

## 2023-05-18 DIAGNOSIS — C73: ICD-10-CM

## 2023-05-18 DIAGNOSIS — M85.89: Primary | ICD-10-CM

## 2023-05-18 DIAGNOSIS — E89.2: ICD-10-CM

## 2023-05-18 NOTE — DEXA REPORT
PROCEDURE: Dexa Spine and/or Hip

 

INDICATIONS: OSTEOPOROSIS, THYROID CA

 

TECHNIQUE: Dual energy x-ray absorptiometry (DXA) was performed on a [employer] System.  Regions measur
ed are the AP Spine, femoral neck, and if needed forearm.

 

COMPARISON: None

  

FINDINGS:

 

Lumbar Spine: 

Bone Mineral Density 1.0 g/cm/cm,T score  -1.5. Osteopenia

 

Left Femoral Neck:

Bone Mineral Density 0.8 g/cm/cm, T score -2.3. Osteopenia

 

Impression: 

By WHO criteria, this patient has osteopenia of the lumbar spine and left femoral neck. 

 

Patients with diagnosis of osteoporosis or osteopenia should have regular bone mineral density assess
ment.  For those eligible for Medicare, routine testing is allowed once every 2 years.  Testing frequ
ency can be increased for patients who have rapidly progressing disease or for those who are receivin
g medical therapy to restore bone mass.

 

Reviewed by: Norah Varghese MD on 5/18/2023 4:45 PM PDT

Approved by: Norah Varghese MD on 5/18/2023 4:45 PM PDT

 

 

Station ID:  [id number]

## 2023-05-20 NOTE — ULTRASOUND REPORT
PROCEDURE:  Head or Neck Soft Tissue

 

INDICATIONS:  OSTEOPOROSIS, THYROID CA

 

TECHNIQUE:  

Real-time scanning was performed of the thyroid gland, with image documentation.  

 

COMPARISON:  None

 

FINDINGS:  Thyroid gland is absent. No mass or enlarged lymph nodes in thyroid bed.

 

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 No recurrent mass or enlar
ged lymph nodes.

 

ACR TI-RADS definitions and recommendations:  

TI-RADS 1 (benign): 0 points.  FNA not needed. 

TI-RADS 2 (not suspicious): 2 points.  FNA not needed. 

TI-RADS 3 (mildly suspicious): 3 points. 

 "FNA if 2.5 cm or larger, follow up if 1.5 cm or larger (at 1, 3, and 5 years). 

TI-RADS 4 (moderately suspicious): 4-6 points.  

 "FNA if 1.5 cm or larger, follow up if 1 cm or larger (at 1, 2, 3, and 5 years).  

TI-RADS 5 (highly suspicious): 7 points or more.  

 "FNA if 1 cm or larger, follow up if 0.5 cm or larger (every year for 5 years).  

 

Reviewed by: Ashley Martínez MD on 5/20/2023 8:44 AM PDT

Approved by: Ashley Martínez MD on 5/20/2023 8:44 AM PDT

 

 

Station ID:  [id number]

## 2023-09-13 ENCOUNTER — HOSPITAL ENCOUNTER (OUTPATIENT)
Dept: HOSPITAL 76 - LAB.S | Age: 73
Discharge: HOME | End: 2023-09-13
Attending: NURSE PRACTITIONER
Payer: MEDICARE

## 2023-09-13 DIAGNOSIS — Z13.220: ICD-10-CM

## 2023-09-13 DIAGNOSIS — Z13.228: Primary | ICD-10-CM

## 2023-09-13 DIAGNOSIS — Z13.0: ICD-10-CM

## 2023-09-13 LAB
ALBUMIN DIAFP-MCNC: 4.1 G/DL (ref 3.2–5.5)
ALBUMIN/GLOB SERPL: 1.5 {RATIO} (ref 1–2.2)
ALP SERPL-CCNC: 67 IU/L (ref 42–121)
ALT SERPL W P-5'-P-CCNC: 16 IU/L (ref 10–60)
ANION GAP SERPL CALCULATED.4IONS-SCNC: 5 MMOL/L (ref 6–13)
AST SERPL W P-5'-P-CCNC: 21 IU/L (ref 10–42)
BASOPHILS NFR BLD AUTO: 0 10^3/UL (ref 0–0.1)
BASOPHILS NFR BLD AUTO: 0.7 %
BILIRUB BLD-MCNC: 0.4 MG/DL (ref 0.2–1)
BUN SERPL-MCNC: 19 MG/DL (ref 6–20)
CALCIUM UR-MCNC: 9.9 MG/DL (ref 8.5–10.3)
CHLORIDE SERPL-SCNC: 104 MMOL/L (ref 101–111)
CHOLEST SERPL-MCNC: 233 MG/DL
CO2 SERPL-SCNC: 31 MMOL/L (ref 21–32)
CREAT SERPLBLD-SCNC: 1.1 MG/DL (ref 0.6–1.3)
EOSINOPHIL # BLD AUTO: 0 10^3/UL (ref 0–0.7)
EOSINOPHIL NFR BLD AUTO: 0.7 %
ERYTHROCYTE [DISTWIDTH] IN BLOOD BY AUTOMATED COUNT: 12.5 % (ref 12–15)
GFRSERPLBLD MDRD-ARVRAT: 49 ML/MIN/{1.73_M2} (ref 89–?)
GLOBULIN SER-MCNC: 2.7 G/DL (ref 2.1–4.2)
GLUCOSE SERPL-MCNC: 97 MG/DL (ref 74–104)
HCT VFR BLD AUTO: 40.1 % (ref 37–47)
HDLC SERPL-MCNC: 74 MG/DL
HDLC SERPL: 3.1 {RATIO} (ref ?–4.4)
HGB UR QL STRIP: 12.8 G/DL (ref 12–16)
LDLC SERPL CALC-MCNC: 106 MG/DL
LDLC/HDLC SERPL: 1.4 {RATIO} (ref ?–4.4)
LYMPHOCYTES # SPEC AUTO: 2.1 10^3/UL (ref 1.5–3.5)
LYMPHOCYTES NFR BLD AUTO: 37.3 %
MCH RBC QN AUTO: 30.3 PG (ref 27–31)
MCHC RBC AUTO-ENTMCNC: 31.9 G/DL (ref 32–36)
MCV RBC AUTO: 94.8 FL (ref 81–99)
MONOCYTES # BLD AUTO: 0.5 10^3/UL (ref 0–1)
MONOCYTES NFR BLD AUTO: 8.7 %
NEUTROPHILS # BLD AUTO: 3 10^3/UL (ref 1.5–6.6)
NEUTROPHILS # SNV AUTO: 5.7 X10^3/UL (ref 4.8–10.8)
NEUTROPHILS NFR BLD AUTO: 52.4 %
NRBC # BLD AUTO: 0 /100WBC
NRBC # BLD AUTO: 0 X10^3/UL
PDW BLD AUTO: 10.2 FL (ref 7.9–10.8)
PLATELET # BLD: 323 10^3/UL (ref 130–450)
POTASSIUM SERPL-SCNC: 3.9 MMOL/L (ref 3.5–4.5)
PROT SPEC-MCNC: 6.8 G/DL (ref 6.4–8.9)
RBC MAR: 4.23 10^6/UL (ref 4.2–5.4)
SODIUM SERPLBLD-SCNC: 140 MMOL/L (ref 135–145)
TRIGL P FAST SERPL-MCNC: 267 MG/DL (ref 48–352)
VLDLC SERPL-SCNC: 53 MG/DL

## 2023-09-13 PROCEDURE — 85025 COMPLETE CBC W/AUTO DIFF WBC: CPT

## 2023-09-13 PROCEDURE — 83721 ASSAY OF BLOOD LIPOPROTEIN: CPT

## 2023-09-13 PROCEDURE — 36415 COLL VENOUS BLD VENIPUNCTURE: CPT

## 2023-09-13 PROCEDURE — 80061 LIPID PANEL: CPT

## 2023-09-13 PROCEDURE — 80053 COMPREHEN METABOLIC PANEL: CPT

## 2023-10-02 ENCOUNTER — HOSPITAL ENCOUNTER (OUTPATIENT)
Dept: HOSPITAL 76 - SC | Age: 73
Discharge: HOME | End: 2023-10-02
Attending: INTERNAL MEDICINE
Payer: MEDICARE

## 2023-10-02 DIAGNOSIS — G47.8: ICD-10-CM

## 2023-10-02 DIAGNOSIS — G47.00: Primary | ICD-10-CM

## 2023-10-02 DIAGNOSIS — R41.89: ICD-10-CM

## 2023-10-02 DIAGNOSIS — G44.221: ICD-10-CM

## 2023-10-02 PROCEDURE — 99202 OFFICE O/P NEW SF 15 MIN: CPT

## 2023-10-02 PROCEDURE — 99212 OFFICE O/P EST SF 10 MIN: CPT

## 2023-10-05 VITALS — SYSTOLIC BLOOD PRESSURE: 118 MMHG | OXYGEN SATURATION: 99 % | DIASTOLIC BLOOD PRESSURE: 62 MMHG

## 2023-10-05 NOTE — SLEEP CARE CONSULTATION
Information from patient questionnaire entered by Trish Soria.





I have reviewed and concur with the information entered by Trish Soria. This 

document represents the service I personally performed and the decisions made by

me, Skye Francis MD, [facility].





History of Present Illness


Service Date and Time: 10/02/2023    1045


Reason for Visit: New patient


Chief Complaint: reports: Unrefreshed sleep, Frequent awakenings at night, Other

(MEEMORY LAPSE)


Date of Onset: MEMORY LAPSE 4-6MONTHS AWAKENINGS 20YRS


Usual bedtime: 9PM


Time it takes to fall asleep: 5-10MIN


Snores at night: No


Observed to quit breathing while asleep: No


Sleeps alone due to snoring: Yes


Number of times waking at night: 1


Reasons for waking at night: reports: Bathroom, Other (UNKNOWN)


Toss, Turn, or Twitch while sleeping: No


Recalls having dreams: Yes


Usually gets out of bed at: 5AM


Feels refreshed in the morning: Yes


Morning headache: No


Sleepy or fatigued during the day: Yes


Ever fallen asleep while driving: No


Takes day naps: Yes


Dreams during day naps: Yes


Prior sleep studies: No


Additional HPI information: 


I have the pleasure of seeing Ms. Ng along with her  today 

regarding the possibility of her having obstructive sleep apnea.  As you know, 

she is a 73 year old lady who complains of frequent awakenings, unrefreshed 

sleep, and memory loss.  The patient tells me that she normally goes to bed 

around 9 pm, and it takes her approximately 5 - 10 minutes to fall asleep.  She 

takes trazodone.  She not been told that she snores loudly and irregularly at 

night.  She has never been observed to stop breathing in her sleep.  However, 

she sleeps alone.  She can recall waking up on the average of 1 time during the 

night but has difficulty falling back asleep.  Most of the time she wakes up b

ecause of having to use the bathroom.  She has never awakened because of her own

snoring, choking, or having to gasp for air.  There is not a lot of tossing and 

turning in her sleep.  No somniloquy (sleep talking) or somnambulism (sleep 

walking).  Generally, she can recall having dreams.  In the morning she usually 

gets up out of the bed around 5 - 6 a.m. not feeling refreshed nor rested.  She 

has headaches but they do not start in the morning.  During the day she 

complains of feeling sleepy and fatigued.  Her score on [place] Sleepiness Scale

is 3 out of 24.  She never has fallen asleep while driving nor has had any 

accident due to sleepiness.  She usually takes 20-minute naps during the day.  

She reports having impaired concentration during the day.








- Parasomnia Symptoms


Ever been unable to move upon waking from sleep: No


Walks in sleep: No


Talks in sleep: No


Ever acted out dreams in sleep: No


Ever felt weak in the knees when startled or emotional: No


Bothered by creepy, crawly, restless sensations in legs: No


Problems with memory or concentration: No





Subjective


Initial [place] Sleepiness Scale score: 3 (09/29/23)





Past Medical History


Past Medical History: reports: Claustrophobia, Hypothyroidism, Other (MEDICATION

TO REPLACE SURGICALLY REMOVED THYROID AND PARATHYROID GLANDS)





Social History


The patient's occupation is a RETIRED. Patient is  and lives in [place]. 





Have you smoked in the past 12 months: No


Alcohol use: No


Caffeine use: No





Family History


Family history of sleep disordered breathing: No (UNKNOWN)





Allergies and Home Medications


Known drug allergies: Yes (AZITHROMYYCIN)


Drug allergies reviewed: Yes


Home medication list reviewed: Yes


Allergy and home medication list: 


Allergies





azithromycin [From Zithromax Z-Nikunj] Allergy (Verified 09/29/23 10:44)


   Unknown











Review of Systems


Cardiovascular: denies: high blood pressure, palpitations, chest pain, irregular

heart rate or pulse, leg or foot swelling, have to sleep sitting up, other


Respiratory: denies: shortness of breath, wheeze, sputum production, chronic 

cough, other


Gastrointestinal: denies: heartburn, difficulty swallowing, nausea, vomitting, 

diarrhea, abdominal pain, other


Urinary: denies: incontinence, frequency, urgency, impotence, other


Neurological: reports: headaches


Psychiatric: denies: Attention Deficit Hyperactivity, anxiety, depression, mood 

disorder, claustrophobia, other


Ear/Nose/Throat: reports: tonsillectomy, wisdom teeth removed


Endocrine: reports: thyroid disease


Musculoskeletal: reports: muscle pain or cramping


Immunologic: denies: sneezing, rash, itching, allergies to food or environment, 

other





Physical Exam


Vital signs obtained and entered by: TRISH RODRIGUEZ MA


Blood Pressure: 118/62 (LEFT ARM)


Cuff size: regular


Heart Rate: 63


O2 Saturation: 99


Height: 5 ft 4 in


Weight: 136 lb 12.8 oz


Body Mass Index: 23.4


BMI Classification: Normal


Neck circumference: 13.25


Mood/affect: Winston


HEENT: No craniofacial malformation


Nostrils: patent to airflow


Turbinates: normal


Septum: midline


Mouth and throat: normal


Soft palate: normal


Hard palate: normal


Uvula: normal


Uvula visualization: 100% Mallampati Class I


Tongue: normal in size


Tonsils: absent bilaterally


Chin and jaw: normal size and position


Heart: regular rate and rhythm


Lungs: clear bilaterally


Extremities: no edema or clubbing


Neurologic: intact





Impression and Plan


IMPRESSION: 1. Insomnia, involving sleep maintenance where she wakes up during 

the night and unable to fall back asleep.   This may be due to excessive time 

spent in bed of 9 hours or more.  In addition, she takes a nap during the day.  

When she lies awake during the night, she also compensates by getting out of bed

later.  I advise her to spend no more than 7.5 hours in bed at night because she

takes a nap during the day.  This means that if she goes to bed at 9 pm, she 

needs to get out of bed regularly at 4:30 am, regardless of whether she wakes up

and stays up during the night or not.  The fact that she is not sleepy during 

the day (Randolph Sleepiness Scale score of 3) also confirms that she has been 

getting adequate sleep all along.  Adequate sleep, no sleepy during the day.  An

in-laboratory polysomnography will be ordered to rule out sleep disrupting co

nditions.





Plan:  1. Schedule polysomnography to see if she has sleep disrupting conditions

that could contribute to memory loss.


2. Maintain a regular wake up time and spend no more than 7.5 hours in bed at 

night because she naps for 20 minutes during the day.


3. Return for follow up after the sleep study.





Follow up with Sleep Care in: 1-2 months


Visit Type: In Office


Other Participants: Spouse/Significant Other


Time Spent with Patient (minutes): 15


Provider Statement: I spent 100% of the Face to Face Visit with the patient with

greater than 50% spent counseling the patient and coordination of care.

## 2023-10-29 ENCOUNTER — HOSPITAL ENCOUNTER (OUTPATIENT)
Dept: HOSPITAL 76 - SC | Age: 73
Discharge: HOME | End: 2023-10-29
Attending: INTERNAL MEDICINE
Payer: MEDICARE

## 2023-10-29 DIAGNOSIS — G47.8: ICD-10-CM

## 2023-10-29 DIAGNOSIS — G47.00: Primary | ICD-10-CM

## 2023-10-29 DIAGNOSIS — G44.221: ICD-10-CM

## 2023-10-29 DIAGNOSIS — R41.89: ICD-10-CM

## 2023-10-29 PROCEDURE — 95810 POLYSOM 6/> YRS 4/> PARAM: CPT

## 2023-11-07 ENCOUNTER — HOSPITAL ENCOUNTER (OUTPATIENT)
Dept: HOSPITAL 76 - DI.S | Age: 73
Discharge: HOME | End: 2023-11-07
Attending: NURSE PRACTITIONER
Payer: MEDICARE

## 2023-11-07 DIAGNOSIS — Z12.31: Primary | ICD-10-CM

## 2023-11-07 DIAGNOSIS — R92.333: ICD-10-CM

## 2023-11-07 DIAGNOSIS — N63.14: ICD-10-CM

## 2023-11-07 DIAGNOSIS — R92.1: ICD-10-CM

## 2023-11-07 NOTE — MAMMOGRAPHY REPORT
BILATERAL DIGITAL SCREENING MAMMOGRAM 3D/2D: 11/7/2023

CLINICAL: Routine screening.  

 

Comparison is made to exams dated:  8/5/2022 mammogram, 9/3/2020 mammogram, 1/13/2020 mammogram, 12/1
8/2019 mammogram, and 6/27/2016 mammogram - [facility].  

 

Both breasts are heterogeneously dense, which may obscure small masses (category c / 51-75% glandular
 tissue).  

 

Right breast:

There is a mass in the right breast lower inner quadrant at anterior depth.

 

Left breast:  

There are calcifications in the left breast outer region at posterior depth seen on the craniocaudal 
view only.  

 

No other significant masses or calcifications are seen in either breast.  

 

IMPRESSION: INCOMPLETE: NEEDS ADDITIONAL IMAGING EVALUATION

1) Right breast anterior lower inner quadrant mass. Needs additional imaging evaluation. Finding may 
represent a lymph node. Recommend diagnostic right breast mammogram and ultrasound for further evalua
tion. 

 

2) Left breast posterior outer calcifications seen on CC view only. Needs additional imaging evaluati
on. Recommend diagnostic left breast mammogram with spot magnifications views and ultrasound for furt
her evaluation. 

 

Based on the Tyrer Cuzick model (a risk assessment model) the patients lifetime risk is 6.0% and her
 10 year risk is 4.1%. According to the ACR, ACS, and NCCN guidelines, an annual breast MRI exam tea
g with mammogram is recommended if the patients lifetime risk is 20% or greater.

 

 

This exam was interpreted at Station ID: [id number].  

 

NOTE: For mammograms, a report in lay terms will be sent to the patient. Approximately 15% of breast 
malignancies will not be visualized mammographically. In the management of a palpable breast mass, a 
negative mammogram must not discourage biopsy of a clinically suspicious lesion.

 

Electronically Signed By: Dara Bunch M.D., PH.D          

eb/:11/7/2023 09:02:03  

 

 

 

ACR BI-RADS Category 0: Incomplete 3340F

PARENCHYMAL PATTERN: (D) - The breast(s) demonstrate(s) heterogeneously dense fibroglandular parenchy
ma.

BI-RADS CATEGORY: (0) - 0

Mammo and US

[id number]

Immediate follow-up

LATERALITY: (B)

## 2023-11-13 ENCOUNTER — HOSPITAL ENCOUNTER (OUTPATIENT)
Dept: HOSPITAL 76 - SC | Age: 73
Discharge: HOME | End: 2023-11-13
Attending: INTERNAL MEDICINE
Payer: MEDICARE

## 2023-11-13 DIAGNOSIS — R06.83: Primary | ICD-10-CM

## 2023-11-13 NOTE — SLEEP CARE CONSULTATION
Information from patient questionnaire entered by Trish Soria.





I have reviewed and concur with the information entered by Trish Soria. This 

document represents the service I personally performed and the decisions made by

me, Skye Francis MD, [facility].





History of Present Illness


Service Date and Time: 11/13/2023    1556


Current [place] Sleepiness Scale score: 3 (11/13/23)


Additional HPI information: 


Ms. Ng was seen via video telemedicine ([employer]) for follow up of the 

sleep study she had on 10/29/23.  The polysomnography showed that the patient 

had slightly reduced sleep efficiency due to prolonged awakening in the middle 

of the night. The sleep architecture was relatively normal considering the 

first-night effect.  Respiratory monitoring showed no significant sleep 

disordered breathing (AHI = 2.8) associated with frequent arousals, 

oxyhemoglobin desaturation and mild hypoxia (génesis oxygen saturation of 88%). 

The few respiratory events occurred mainly during supine REM sleep (supine AHI =

5.3; non-supine = 1.36). Snore was light to moderate in intensity. There was no 

significant periodic leg movement of sleep. Cardiac rhythm was normal sinus 

rhythm without significant arrhythmia. No abnormal behavior (parasomnia) 

observed during the night.





The patient was informed of these findings.  I explained to her that the sleep 

study was normal.  








Sleep Study





- Results


Type of Sleep Study: Polysomnography (COMPLETED 10/29/23)





Allergies and Home Medications


Drug allergies reviewed: Yes


Home medication list reviewed: Yes


Allergy and home medication list: 


Allergies





azithromycin [From Zithromax Z-Nikunj] Allergy (Verified 11/13/23 08:04)


   Unknown











Review of Systems


Review of systems same as previous: Yes





Physical Exam


Vital signs obtained and entered by: TRISH RODRIGUEZ MA


Height: 5 ft 4.5 in (PER PT)


Weight: 135 lb (PER PT)


Body Mass Index: 22.8


BMI Classification: Normal





Impression and Plan


IMPRESSION: 1. Primary Snore (ICD-10 R06.83), light to moderate, but no 

significant sleep disordered breathing except when the patient slept supine.  

The patient is recommended to avoid sleeping supine.  





PLAN:     1.   Return for a follow up on as needed basis.





Follow up with Sleep Care in: as needed


Visit Type: Telehealth Video


[facility]


Patient Location: Home


Patient agrees and consents to this telehealth visit type: Yes


Patient agrees to have their insurance billed: Yes


Time Spent with Patient (minutes): 15


Provider Statement: I spent 100% of the Telehealth Video Call with the patient 

with greater than 50% spent counseling the patient and coordination of care.

## 2023-11-27 ENCOUNTER — HOSPITAL ENCOUNTER (OUTPATIENT)
Dept: HOSPITAL 76 - DI | Age: 73
Discharge: HOME | End: 2023-11-27
Attending: NURSE PRACTITIONER
Payer: MEDICARE

## 2023-11-27 DIAGNOSIS — R92.8: Primary | ICD-10-CM

## 2023-11-27 DIAGNOSIS — R92.333: ICD-10-CM

## 2023-11-28 NOTE — ULTRASOUND REPORT
LIMITED ULTRASOUND OF RIGHT BREAST: 11/27/2023

CLINICAL: Patient returns today to evaluate a focal asymmetry in the right breast.  

 

Comparison is made to exams dated:  11/27/2023 mammogram, 11/7/2023 mammogram, 8/5/2022 mammogram, 1/
13/2020 mammogram, 12/18/2019 mammogram, and 6/27/2016 mammogram - [facility].  

Real-time and continuous wave Doppler ultrasound of the right breast 3-6 o'clock region were performe
d.  

 

No significant abnormalities were seen sonographically in the right breast.  

 

IMPRESSION: PROBABLY BENIGN 

There is no abnormality seen in the right breast to correspond with the mammography finding in the an
terior depth in the lower inner quadrant.  This is probably benign.

A follow-up right mammogram with possible ultrasound in 6 months is recommended to demonstrate stabil
ity.  Also, a follow up left mammogram in 6 months is recommended to document stability of a probably
 benign group of calcifications in the left breast.

 

Findings and recommendations were conveyed to the patient during today's evaluation.

 

 

This exam was interpreted at Station ID: [id number].  

Electronically Signed By: Germán Richardson M.D.  

aty/:11/27/2023 14:56:06  

 

 

 

Ultrasound BI-RADS: 3 Probably benign

BI-RADS CATEGORY: (3) - 3

Mammo and US

[id number]

6 month follow-up

LATERALITY: (B)

## 2023-11-28 NOTE — MAMMOGRAPHY REPORT
BILATERAL DIGITAL DIAGNOSTIC MAMMOGRAM 3D/2D: 11/27/2023

CLINICAL: Patient returns today to evaluate a focal asymmetry in the right breast and magnification v
iews of microcalcifications in the left breast.  

 

Comparison is made to exams dated:  11/7/2023 mammogram, 8/5/2022 mammogram, 9/3/2020 mammogram, 1/13
/2020 mammogram, 12/18/2019 mammogram, and 6/27/2016 mammogram - [facility].  

 

Both breasts are heterogeneously dense, which may obscure small masses (category c / 51-75% glandular
 tissue).  

 

There is a 0.6 cm oval equal density mass in the right breast at 5 o'clock anterior depth.  This is s
een in additional views.  This is more prominent.  

There are grouped coarse punctate calcifications in the left breast at 4 o'clock posterior depth.  

No other significant masses or calcifications are seen in either breast.  

 

IMPRESSION: INCOMPLETE: NEEDS ADDITIONAL IMAGING EVALUATION

The grouped coarse punctate calcifications in the left breast at 4 o'clock posterior depth are probab
ly benign.  Follow up left mammogram in 6 months is recommended.

 

The 0.6 cm oval equal density mass in the right breast at 5 o'clock anterior depth is indeterminate. 
 An ultrasound is recommended for further evaluation and is scheduled to immediately follow this exam
ination. 

 

 

Based on the Tyrer Cuzick model (a risk assessment model) the patients lifetime risk is 6.0% and her
 10 year risk is 4.1%. According to the ACR, ACS, and NCCN guidelines, an annual breast MRI exam tea
g with mammogram is recommended if the patients lifetime risk is 20% or greater.

 

 

This exam was interpreted at Station ID: [id number].  

 

NOTE: For mammograms, a report in lay terms will be sent to the patient. Approximately 15% of breast 
malignancies will not be visualized mammographically. In the management of a palpable breast mass, a 
negative mammogram must not discourage biopsy of a clinically suspicious lesion.

 

Electronically Signed By: Germán Richardson M.D.          

aty/:11/27/2023 11:08:10  

 

 

 

ACR BI-RADS Category 0: Incomplete 3340F

PARENCHYMAL PATTERN: (D) - The breast(s) demonstrate(s) heterogeneously dense fibroglandular myles hurd.

BI-RADS CATEGORY: (0) - 0

Ultrasound

[id number]

Immediate follow-up

LATERALITY: (R)

## 2024-01-22 ENCOUNTER — HOSPITAL ENCOUNTER (OUTPATIENT)
Dept: HOSPITAL 76 - LAB.S | Age: 74
Discharge: HOME | End: 2024-01-22
Attending: INTERNAL MEDICINE
Payer: MEDICARE

## 2024-01-22 DIAGNOSIS — E89.0: Primary | ICD-10-CM

## 2024-01-22 DIAGNOSIS — E20.89: ICD-10-CM

## 2024-01-22 LAB
ALBUMIN DIAFP-MCNC: 4.3 G/DL (ref 3.2–5.5)
ANION GAP SERPL CALCULATED.4IONS-SCNC: 8 MMOL/L (ref 6–13)
BUN SERPL-MCNC: 18 MG/DL (ref 6–20)
CALCIUM UR-MCNC: 8.9 MG/DL (ref 8.5–10.3)
CHLORIDE SERPL-SCNC: 101 MMOL/L (ref 101–111)
CO2 SERPL-SCNC: 30 MMOL/L (ref 21–32)
CREAT SERPLBLD-SCNC: 0.9 MG/DL (ref 0.6–1.3)
GFRSERPLBLD MDRD-ARVRAT: 62 ML/MIN/{1.73_M2} (ref 89–?)
GLUCOSE SERPL-MCNC: 70 MG/DL (ref 74–104)
PHOSPHATE BLD-MCNC: 3.8 MG/DL (ref 2.5–5)
POTASSIUM SERPL-SCNC: 3.6 MMOL/L (ref 3.5–4.5)
SODIUM SERPLBLD-SCNC: 139 MMOL/L (ref 135–145)
TSH SERPL-ACNC: 1.62 UIU/ML (ref 0.34–5.6)

## 2024-01-22 PROCEDURE — 80069 RENAL FUNCTION PANEL: CPT

## 2024-01-22 PROCEDURE — 36415 COLL VENOUS BLD VENIPUNCTURE: CPT

## 2024-01-22 PROCEDURE — 84443 ASSAY THYROID STIM HORMONE: CPT

## 2024-01-22 PROCEDURE — 82306 VITAMIN D 25 HYDROXY: CPT

## 2024-01-22 PROCEDURE — 84439 ASSAY OF FREE THYROXINE: CPT

## 2024-09-11 ENCOUNTER — HOSPITAL ENCOUNTER (OUTPATIENT)
Dept: HOSPITAL 76 - LAB.S | Age: 74
Discharge: HOME | End: 2024-09-11
Attending: INTERNAL MEDICINE
Payer: MEDICARE

## 2024-09-11 DIAGNOSIS — E20.89: ICD-10-CM

## 2024-09-11 DIAGNOSIS — E89.0: Primary | ICD-10-CM

## 2024-09-11 LAB
ALBUMIN DIAFP-MCNC: 4.1 G/DL (ref 3.2–5.5)
ANION GAP SERPL CALCULATED.4IONS-SCNC: 8 MMOL/L (ref 6–13)
BUN SERPL-MCNC: 16 MG/DL (ref 6–20)
CALCIUM UR-MCNC: 9.1 MG/DL (ref 8.5–10.3)
CHLORIDE SERPL-SCNC: 104 MMOL/L (ref 101–111)
CO2 SERPL-SCNC: 29 MMOL/L (ref 21–32)
CREAT SERPLBLD-SCNC: 1 MG/DL (ref 0.6–1.3)
GFRSERPLBLD MDRD-ARVRAT: 55 ML/MIN/{1.73_M2} (ref 89–?)
GLUCOSE SERPL-MCNC: 103 MG/DL (ref 74–104)
PHOSPHATE BLD-MCNC: 3.7 MG/DL (ref 2.5–5)
POTASSIUM SERPL-SCNC: 3.9 MMOL/L (ref 3.5–4.5)
SODIUM SERPLBLD-SCNC: 141 MMOL/L (ref 135–145)
TSH SERPL-ACNC: 0.46 UIU/ML (ref 0.34–5.6)

## 2024-09-11 PROCEDURE — 84439 ASSAY OF FREE THYROXINE: CPT

## 2024-09-11 PROCEDURE — 80069 RENAL FUNCTION PANEL: CPT

## 2024-09-11 PROCEDURE — 36415 COLL VENOUS BLD VENIPUNCTURE: CPT

## 2024-09-11 PROCEDURE — 84443 ASSAY THYROID STIM HORMONE: CPT

## 2024-09-11 PROCEDURE — 82306 VITAMIN D 25 HYDROXY: CPT
